# Patient Record
Sex: FEMALE | URBAN - NONMETROPOLITAN AREA
[De-identification: names, ages, dates, MRNs, and addresses within clinical notes are randomized per-mention and may not be internally consistent; named-entity substitution may affect disease eponyms.]

---

## 2020-08-05 ENCOUNTER — NEW PATIENT (OUTPATIENT)
Dept: URBAN - NONMETROPOLITAN AREA CLINIC 12 | Facility: CLINIC | Age: 80
End: 2020-08-05
Payer: MEDICARE

## 2020-08-05 DIAGNOSIS — Z96.1 PRESENCE OF INTRAOCULAR LENS: ICD-10-CM

## 2020-08-05 DIAGNOSIS — H33.052 TOTAL RETINAL DETACHMENT, LEFT EYE: ICD-10-CM

## 2020-08-05 DIAGNOSIS — H52.11 MYOPIA, RIGHT EYE: ICD-10-CM

## 2020-08-05 DIAGNOSIS — Z79.84 LONG TERM (CURRENT) USE OF ORAL ANTIDIABETIC DRUGS: ICD-10-CM

## 2020-08-05 DIAGNOSIS — E11.9 TYPE 2 DIABETES MELLITUS WITHOUT COMPLICATIONS: Primary | ICD-10-CM

## 2020-08-05 PROCEDURE — 92015 DETERMINE REFRACTIVE STATE: CPT | Performed by: OPTOMETRIST

## 2020-08-05 PROCEDURE — 92004 COMPRE OPH EXAM NEW PT 1/>: CPT | Performed by: OPTOMETRIST

## 2020-08-05 ASSESSMENT — INTRAOCULAR PRESSURE
OD: 12
OS: 13

## 2020-08-05 ASSESSMENT — VISUAL ACUITY
OS: 20/HM
OD: 20/40

## 2021-09-09 ENCOUNTER — OFFICE VISIT (OUTPATIENT)
Dept: URBAN - NONMETROPOLITAN AREA CLINIC 14 | Facility: CLINIC | Age: 81
End: 2021-09-09
Payer: MEDICARE

## 2021-09-09 PROCEDURE — 92014 COMPRE OPH EXAM EST PT 1/>: CPT | Performed by: OPTOMETRIST

## 2021-09-09 ASSESSMENT — INTRAOCULAR PRESSURE
OD: 10
OS: 11

## 2021-09-09 ASSESSMENT — VISUAL ACUITY
OD: 20/50
OS: HM

## 2021-09-09 NOTE — IMPRESSION/PLAN
Impression: Type 2 diabetes mellitus without complications: V47.5. Plan: Stable from above. Continue care with PCP.

## 2022-09-09 ENCOUNTER — OFFICE VISIT (OUTPATIENT)
Dept: URBAN - NONMETROPOLITAN AREA CLINIC 14 | Facility: CLINIC | Age: 82
End: 2022-09-09
Payer: COMMERCIAL

## 2022-09-09 DIAGNOSIS — H33.052 TOTAL RETINAL DETACHMENT, LEFT EYE: ICD-10-CM

## 2022-09-09 DIAGNOSIS — Z79.84 LONG TERM (CURRENT) USE OF ORAL ANTIDIABETIC DRUGS: ICD-10-CM

## 2022-09-09 DIAGNOSIS — H52.221 REGULAR ASTIGMATISM, RIGHT EYE: ICD-10-CM

## 2022-09-09 DIAGNOSIS — E11.9 TYPE 2 DIABETES MELLITUS WITHOUT COMPLICATIONS: Primary | ICD-10-CM

## 2022-09-09 PROCEDURE — 99214 OFFICE O/P EST MOD 30 MIN: CPT | Performed by: OPTOMETRIST

## 2022-09-09 ASSESSMENT — INTRAOCULAR PRESSURE
OS: 10
OD: 10

## 2022-09-09 ASSESSMENT — VISUAL ACUITY: OD: 20/40

## 2022-09-09 NOTE — IMPRESSION/PLAN
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 Plan: Stable.   [facility]

## 2022-09-09 NOTE — IMPRESSION/PLAN
Impression: Type 2 diabetes mellitus without complications: O45.8. Plan: No Non-Proliferative Diabetic Retinopathy, no Diabetic Macular Edema and no Neovascularization of the iris, disc, or elsewhere. Discussed ocular and systemic benefits of blood sugar control. Send notes to PCP. Check annually. RTC 1 year x CEE.

## 2023-07-24 ENCOUNTER — OFFICE VISIT (OUTPATIENT)
Dept: URBAN - NONMETROPOLITAN AREA CLINIC 14 | Facility: CLINIC | Age: 83
End: 2023-07-24
Payer: COMMERCIAL

## 2023-07-24 DIAGNOSIS — H52.221 REGULAR ASTIGMATISM, RIGHT EYE: ICD-10-CM

## 2023-07-24 DIAGNOSIS — E11.9 TYPE 2 DIABETES MELLITUS WITHOUT COMPLICATIONS: Primary | ICD-10-CM

## 2023-07-24 DIAGNOSIS — H33.052 TOTAL RETINAL DETACHMENT, LEFT EYE: ICD-10-CM

## 2023-07-24 DIAGNOSIS — Z79.84 LONG TERM (CURRENT) USE OF ORAL ANTIDIABETIC DRUGS: ICD-10-CM

## 2023-07-24 PROCEDURE — 99214 OFFICE O/P EST MOD 30 MIN: CPT | Performed by: OPTOMETRIST

## 2023-07-24 ASSESSMENT — INTRAOCULAR PRESSURE
OD: 10
OS: 8

## 2023-07-24 ASSESSMENT — VISUAL ACUITY
OD: 20/50
OS: LP